# Patient Record
Sex: FEMALE | Race: WHITE | URBAN - METROPOLITAN AREA
[De-identification: names, ages, dates, MRNs, and addresses within clinical notes are randomized per-mention and may not be internally consistent; named-entity substitution may affect disease eponyms.]

---

## 2017-10-28 ENCOUNTER — HOSPITAL ENCOUNTER (INPATIENT)
Facility: CLINIC | Age: 67
LOS: 3 days | Discharge: HOME | DRG: 603 | End: 2017-10-31
Attending: INTERNAL MEDICINE | Admitting: INTERNAL MEDICINE
Payer: COMMERCIAL

## 2017-10-28 ENCOUNTER — TRANSFERRED RECORDS (OUTPATIENT)
Dept: HEALTH INFORMATION MANAGEMENT | Facility: CLINIC | Age: 67
End: 2017-10-28

## 2017-10-28 DIAGNOSIS — L03.113 RIGHT ARM CELLULITIS: Primary | ICD-10-CM

## 2017-10-28 LAB
CREAT SERPL-MCNC: 0.91 MG/DL (ref 0.52–1.04)
GFR SERPL CREATININE-BSD FRML MDRD: 62 ML/MIN/1.7M2

## 2017-10-28 PROCEDURE — 12000000 ZZH R&B MED SURG/OB

## 2017-10-28 PROCEDURE — 36415 COLL VENOUS BLD VENIPUNCTURE: CPT | Performed by: INTERNAL MEDICINE

## 2017-10-28 PROCEDURE — 25000128 H RX IP 250 OP 636: Performed by: INTERNAL MEDICINE

## 2017-10-28 PROCEDURE — 00000146 ZZHCL STATISTIC GLUCOSE BY METER IP

## 2017-10-28 PROCEDURE — 82565 ASSAY OF CREATININE: CPT | Performed by: INTERNAL MEDICINE

## 2017-10-28 PROCEDURE — 99223 1ST HOSP IP/OBS HIGH 75: CPT | Mod: AI | Performed by: INTERNAL MEDICINE

## 2017-10-28 RX ORDER — DEXTROSE MONOHYDRATE 25 G/50ML
25-50 INJECTION, SOLUTION INTRAVENOUS
Status: DISCONTINUED | OUTPATIENT
Start: 2017-10-28 | End: 2017-10-31 | Stop reason: HOSPADM

## 2017-10-28 RX ORDER — AMOXICILLIN 250 MG
1-2 CAPSULE ORAL 2 TIMES DAILY PRN
Status: DISCONTINUED | OUTPATIENT
Start: 2017-10-28 | End: 2017-10-31 | Stop reason: HOSPADM

## 2017-10-28 RX ORDER — LEVOTHYROXINE SODIUM 150 UG/1
150 TABLET ORAL EVERY MORNING
COMMUNITY
Start: 2015-03-05

## 2017-10-28 RX ORDER — NALOXONE HYDROCHLORIDE 0.4 MG/ML
.1-.4 INJECTION, SOLUTION INTRAMUSCULAR; INTRAVENOUS; SUBCUTANEOUS
Status: DISCONTINUED | OUTPATIENT
Start: 2017-10-28 | End: 2017-10-31 | Stop reason: HOSPADM

## 2017-10-28 RX ORDER — TRIAMTERENE/HYDROCHLOROTHIAZID 37.5-25 MG
1 TABLET ORAL
COMMUNITY
Start: 2016-10-28

## 2017-10-28 RX ORDER — OXYCODONE HYDROCHLORIDE 5 MG/1
5-10 TABLET ORAL EVERY 4 HOURS PRN
Status: DISCONTINUED | OUTPATIENT
Start: 2017-10-28 | End: 2017-10-31 | Stop reason: HOSPADM

## 2017-10-28 RX ORDER — NICOTINE POLACRILEX 4 MG
15-30 LOZENGE BUCCAL
Status: DISCONTINUED | OUTPATIENT
Start: 2017-10-28 | End: 2017-10-31 | Stop reason: HOSPADM

## 2017-10-28 RX ORDER — SODIUM CHLORIDE 9 MG/ML
INJECTION, SOLUTION INTRAVENOUS CONTINUOUS
Status: DISCONTINUED | OUTPATIENT
Start: 2017-10-28 | End: 2017-10-29

## 2017-10-28 RX ORDER — POTASSIUM CHLORIDE 750 MG/1
30 TABLET, EXTENDED RELEASE ORAL DAILY
COMMUNITY
Start: 2017-09-29

## 2017-10-28 RX ORDER — ONDANSETRON 2 MG/ML
4 INJECTION INTRAMUSCULAR; INTRAVENOUS EVERY 6 HOURS PRN
Status: DISCONTINUED | OUTPATIENT
Start: 2017-10-28 | End: 2017-10-31 | Stop reason: HOSPADM

## 2017-10-28 RX ORDER — LEVOTHYROXINE SODIUM 75 UG/1
150 TABLET ORAL
Status: DISCONTINUED | OUTPATIENT
Start: 2017-10-29 | End: 2017-10-31 | Stop reason: HOSPADM

## 2017-10-28 RX ORDER — FUROSEMIDE 40 MG
40 TABLET ORAL DAILY
COMMUNITY
Start: 2017-09-10

## 2017-10-28 RX ORDER — METFORMIN HYDROCHLORIDE 500 MG/1
2000 TABLET, FILM COATED, EXTENDED RELEASE ORAL AT BEDTIME
COMMUNITY
Start: 2015-02-04

## 2017-10-28 RX ORDER — ONDANSETRON 4 MG/1
4 TABLET, ORALLY DISINTEGRATING ORAL EVERY 6 HOURS PRN
Status: DISCONTINUED | OUTPATIENT
Start: 2017-10-28 | End: 2017-10-31 | Stop reason: HOSPADM

## 2017-10-28 RX ORDER — ACETAMINOPHEN 325 MG/1
650 TABLET ORAL EVERY 4 HOURS PRN
Status: DISCONTINUED | OUTPATIENT
Start: 2017-10-28 | End: 2017-10-31 | Stop reason: HOSPADM

## 2017-10-28 RX ORDER — POLYETHYLENE GLYCOL 3350 17 G/17G
17 POWDER, FOR SOLUTION ORAL DAILY PRN
Status: DISCONTINUED | OUTPATIENT
Start: 2017-10-28 | End: 2017-10-31 | Stop reason: HOSPADM

## 2017-10-28 RX ADMIN — SODIUM CHLORIDE: 9 INJECTION, SOLUTION INTRAVENOUS at 20:43

## 2017-10-28 ASSESSMENT — ACTIVITIES OF DAILY LIVING (ADL): ADLS_ACUITY_SCORE: 17

## 2017-10-28 NOTE — IP AVS SNAPSHOT
[place] [facility] [address] [facility]    [address]    [facility] [address]    Phone:  [phone]    Fax:  [phone]                                       After Visit Summary   10/28/2017    Lyndsay Biswas    MRN: [id number]           After Visit Summary Signature Page     I have received my discharge instructions, and my questions have been answered. I have discussed any challenges I see with this plan with the nurse or doctor.    ..........................................................................................................................................  Patient/Patient Representative Signature      ..........................................................................................................................................  Patient Representative Print Name and Relationship to Patient    ..................................................               ................................................  Date                                            Time    ..........................................................................................................................................  Reviewed by Signature/Title    ...................................................              ..............................................  Date                                                            Time

## 2017-10-28 NOTE — IP AVS SNAPSHOT
MRN:[id number]                      After Visit Summary   10/28/2017    Lyndsay Biswas    MRN: [id number]           Thank you!     Thank you for choosing [facility] for your care. Our goal is always to provide you with excellent care. Hearing back from our patients is one way we can continue to improve our services. Please take a few minutes to complete the written survey that you may receive in the mail after you visit. If you would like to speak to someone directly about your visit please contact Patient Relations at [phone]. Thank you!          Patient Information     Date Of Birth          1950        Designated Caregiver       Most Recent Value    Caregiver    Will someone help with your care after discharge? yes    Name of designated caregiver Sinan    Phone number of caregiver see facesheet    Caregiver address see facesheet      About your hospital stay     You were admitted on:  October 28, 2017 You last received care in the:  [place] [facility] [address] [facility]    You were discharged on:  October 31, 2017        Reason for your hospital stay       You were hospitalized for cellulitis (skin infection) of the right arm.                  Who to Call     For medical emergencies, please call 911.  For non-urgent questions about your medical care, please call your primary care provider or clinic, None          Attending Provider     Provider Specialty    Gee Siegel MD Internal Medicine       Primary Care Provider    Yehuda Marks      After Care Instructions     Activity       Your activity upon discharge: activity as tolerated            Diet       Follow this diet upon discharge: Orders Placed This Encounter      Moderate Consistent CHO Diet                  Follow-up Appointments     Follow-up and recommended labs and tests        Follow up with your primary care doctor if the cellulitis is not resolving.                  Further instructions  "from your care team       1. Take the Amoxicillin and Bactrim until all the pills are gone to treat the cellulitis of your arm.  While taking the antibiotics you should take a probiotic or eat yogurt with live cultures.  2. If you have return of redness, swelling or pain in the right arm you need to be seen again.        Pending Results     No orders found from 10/26/2017 to 10/29/2017.            Statement of Approval     Ordered          10/31/17 0922  I have reviewed and agree with all the recommendations and orders detailed in this document.  EFFECTIVE NOW     Approved and electronically signed by:  Sonali Szymanski MD             Admission Information     Date & Time Provider Department Dept. Phone    10/28/2017 Gee Siegel MD [place] [address] [facility] [phone]      Your Vitals Were     Blood Pressure Temperature Respirations Height Weight Pulse Oximetry    156/79 (BP Location: Right arm) 97.1  F (36.2  C) (Oral) 16 1.549 m (5' 1\") 126.2 kg (278 lb 3.2 oz) 93%    BMI (Body Mass Index)                   52.57 kg/m2           [employer] Information     [employer] lets you send messages to your doctor, view your test results, renew your prescriptions, schedule appointments and more. To sign up, go to [email or web address] . Click on \"Log in\" on the left side of the screen, which will take you to the Welcome page. Then click on \"Sign up Now\" on the right side of the page.     You will be asked to enter the access code listed below, as well as some personal information. Please follow the directions to create your username and password.     Your access code is: [email or web address]  Expires: 2018  9:31 AM     Your access code will  in 90 days. If you need help or a new code, please call your [place] clinic or [phone].        Care EveryWhere ID     This is your Care EveryWhere ID. This could be used by other organizations to access your [place] medical records  [id number]        Equal " Access to Services     [facility]: Hadii aad ku hadflorecitagil Magdaleno, waaxda luqadaha, qaybta kaalmaelen argueta, jocelyn piedra. So [facility] [phone].    ATENCIÓN: Si habla español, tiene a ceballos disposición servicios gratuitos de asistencia lingüística. Llame al [phone].    We comply with applicable federal civil rights laws and [place] laws. We do not discriminate on the basis of race, color, national origin, age, disability, sex, sexual orientation, or gender identity.               Review of your medicines      START taking        Dose / Directions    amoxicillin 500 MG capsule   Commonly known as:  AMOXIL        Dose:  500 mg   Take 1 capsule (500 mg) by mouth 3 times daily for 6 days   Quantity:  18 capsule   Refills:  0       sulfamethoxazole-trimethoprim 800-160 MG per tablet   Commonly known as:  BACTRIM DS/SEPTRA DS        Dose:  1 tablet   Take 1 tablet by mouth 2 times daily for 6 days   Quantity:  12 tablet   Refills:  0         CONTINUE these medicines which have NOT CHANGED        Dose / Directions    ALEVE PO        Dose:  220 mg   Take 220 mg by mouth daily as needed for moderate pain   Refills:  0       calcium carbonate 1250 MG tablet   Commonly known as:  OS-JERMAIN 500 mg [place]. Ca        Dose:  2 tablet   Take 2 tablets by mouth daily   Refills:  0       furosemide 40 MG tablet   Commonly known as:  LASIX        Dose:  40 mg   Take 40 mg by mouth daily   Refills:  0       KLOR-CON 10 MEQ tablet   Generic drug:  potassium chloride        Dose:  30 mEq   Take 30 mEq by mouth daily   Refills:  0       levothyroxine 150 MCG tablet   Commonly known as:  SYNTHROID/LEVOTHROID        Dose:  150 mcg   Take 150 mcg by mouth every morning   Refills:  0       metFORMIN modified 500 MG 24 hr tablet   Commonly known as:  GLUMETZA        Dose:  2000 mg   Take 2,000 mg by mouth At Bedtime   Refills:  0       Multi-vitamin Tabs tablet        Dose:  1 tablet   Take 1 tablet by mouth  daily   Refills:  0       triamterene-hydrochlorothiazide 37.5-25 MG per tablet   Commonly known as:  MAXZIDE-25        Dose:  1 tablet   Take 1 tablet by mouth nightly as needed for edema   Refills:  0            Where to get your medicines      These medications were sent to [facility] [place], [place] - [address] [facility]  [address] [facility] [address]     Phone:  [phone]     amoxicillin 500 MG capsule    sulfamethoxazole-trimethoprim 800-160 MG per tablet               ANTIBIOTIC INSTRUCTION     You've Been Prescribed an Antibiotic - Now What?  Your healthcare team thinks that you or your loved one might have an infection. Some infections can be treated with antibiotics, which are powerful, life-saving drugs. Like all medications, antibiotics have side effects and should only be used when necessary. There are some important things you should know about your antibiotic treatment.      Your healthcare team may run tests before you start taking an antibiotic.    Your team may take samples (e.g., from your blood, urine or other areas) to run tests to look for bacteria. These test can be important to determine if you need an antibiotic at all and, if you do, which antibiotic will work best.      Within a few days, your healthcare team might change or even stop your antibiotic.    Your team may start you on an antibiotic while they are working to find out what is making you sick.    Your team might change your antibiotic because test results show that a different antibiotic would be better to treat your infection.    In some cases, once your team has more information, they learn that you do not need an antibiotic at all. They may find out that you don't have an infection, or that the antibiotic you're taking won't work against your infection. For example, an infection caused by a virus can't be treated with antibiotics. Staying on an antibiotic when you don't need it is more  likely to be harmful than helpful.      You may experience side effects from your antibiotic.    Like all medications, antibiotics have side effects. Some of these can be serious.    Let you healthcare team know if you have any known allergies when you are admitted to the hospital.    One significant side effect of nearly all antibiotics is the risk of severe and sometimes deadly diarrhea caused by Clostridium difficile (C. Difficile). This occurs when a person takes antibiotics because some good germs are destroyed. Antibiotic use allows C. diificile to take over, putting patients at high risk for this serious infection.    As a patient or caregiver, it is important to understand your or your loved one's antibiotic treatment. It is especially important for caregivers to speak up when patients can't speak for themselves. Here are some important questions to ask your healthcare team.    What infection is this antibiotic treating and how do you know I have that infection?    What side effects might occur from this antibiotic?    How long will I need to take this antibiotic?    Is it safe to take this antibiotic with other medications or supplements (e.g., vitamins) that I am taking?     Are there any special directions I need to know about taking this antibiotic? For example, should I take it with food?    How will I be monitored to know whether my infection is responding to the antibiotic?    What tests may help to make sure the right antibiotic is prescribed for me?      Information provided by:  www.cdc.gov/getsmart  U.S. Department of Health and Human Services  Centers for disease Control and Prevention  National Center for Emerging and Zoonotic Infectious Diseases  Division of Healthcare Quality Promotion         Protect others around you: Learn how to safely use, store and throw away your medicines at www.disposemymeds.org.             Medication List: This is a list of all your medications and when to take  them. Check marks below indicate your daily home schedule. Keep this list as a reference.      Medications           Morning Afternoon Evening Bedtime As Needed    ALEVE PO   Take 220 mg by mouth daily as needed for moderate pain                                   amoxicillin 500 MG capsule   Commonly known as:  AMOXIL   Take 1 capsule (500 mg) by mouth 3 times daily for 6 days            8am       2pm       8pm               calcium carbonate 1250 MG tablet   Commonly known as:  OS-JERMAIN 500 mg [place]. Ca   Take 2 tablets by mouth daily            8am                       furosemide 40 MG tablet   Commonly known as:  LASIX   Take 40 mg by mouth daily   Last time this was given:  40 mg on 10/31/2017  8:48 AM            8am                       KLOR-CON 10 MEQ tablet   Take 30 mEq by mouth daily   Generic drug:  potassium chloride            8am                       levothyroxine 150 MCG tablet   Commonly known as:  SYNTHROID/LEVOTHROID   Take 150 mcg by mouth every morning   Last time this was given:  150 mcg on 10/31/2017  6:57 AM            7am                       metFORMIN modified 500 MG 24 hr tablet   Commonly known as:  GLUMETZA   Take 2,000 mg by mouth At Bedtime                        9pm           Multi-vitamin Tabs tablet   Take 1 tablet by mouth daily            8am                       sulfamethoxazole-trimethoprim 800-160 MG per tablet   Commonly known as:  BACTRIM DS/SEPTRA DS   Take 1 tablet by mouth 2 times daily for 6 days            8am           8pm               triamterene-hydrochlorothiazide 37.5-25 MG per tablet   Commonly known as:  MAXZIDE-25   Take 1 tablet by mouth nightly as needed for edema                                             More Information        Discharge Instructions for Cellulitis  You have been diagnosed with cellulitis. This is an infection in the deepest layer of the skin. In some cases, the infection also affects the muscle. Cellulitis is caused by bacteria. The  bacteria can enter the body through broken skin. This can happen with a cut, scratch, animal bite, or an insect bite that has been scratched. You may have been treated in the hospital with antibiotics and fluids. You will likely be given a prescription for antibiotics to take at home. This sheet will help you take care of yourself at home.  Home care  When you are home:    Take the prescribed antibiotic medicine you are given as directed until it is gone. Take it even if you feel better. It treats the infection and stops it from returning. Not taking all the medicine can make future infections hard to treat.    Keep the infected area clean.    When possible, raise the infected area above the level of your heart. This helps keep swelling down.    Talk with your healthcare provider if you are in pain. Ask what kind of over-the-counter medicine you can take for pain.    Apply clean bandages as advised.    Take your temperature once a day for a week.    Wash your hands often to prevent spreading the infection.  In the future, wash your hands before and after you touch cuts, scratches, or bandages. This will help prevent infection.   When to call your healthcare provider  Call your healthcare provider immediately if you have any of the following:    Difficulty or pain when moving the joints above or below the infected area    Discharge or pus draining from the area    Fever of 100.4 F (38 C) or higher, or as directed by your healthcare provider    Pain that gets worse in or around the infected     Redness that gets worse in or around the infected area, particularly if the area of redness expands to a wider area    Shaking chills    Swelling of the infected area    Vomiting   Date Last Reviewed: 8/1/2016 2000-2017 [employer]. [address] [place] [address], [place] [address]. All rights reserved. This information is not intended as a substitute for professional medical care. Always follow your healthcare  professional's instructions.                Cellulitis  Cellulitis is an infection of the deep layers of skin. A break in the skin, such as a cut or scratch, can let bacteria under the skin. If the bacteria get to deep layers of the skin, it can be serious. If not treated, cellulitis can get into the bloodstream and lymph nodes. The infection can then spread throughout the body. This causes serious illness.  Cellulitis causes the affected skin to become red, swollen, warm, and sore. The reddened areas have a visible border. An open sore may leak fluid (pus). You may have a fever, chills, and pain.  Cellulitis is treated with antibiotics taken for 7 to 10 days. An open sore may be cleaned and covered with cool wet gauze. Symptoms should get better 1 to 2 days after treatment is started. Make sure to take all the antibiotics for the full number of days until they are gone. Keep taking the medicine even if your symptoms go away.  Home care  Follow these tips:    Limit the use of the part of your body with cellulitis.     If the infection is on your leg, keep your leg raised while sitting. This will help to reduce swelling.    Take all of the antibiotic medicine exactly as directed until it is gone. Do not miss any doses, especially during the first 7 days. Don t stop taking the medicine when your symptoms get better.    Keep the affected area clean and dry.    Wash your hands with soap and warm water before and after touching your skin. Anyone else who touches your skin should also wash his or her hands. Don't share towels.  Follow-up care  Follow up with your healthcare provider, or as advised. If your infection does not go away on the first antibiotic, your healthcare provider will prescribe a different one.  When to seek medical advice  Call your healthcare provider right away if any of these occur:    Red areas that spread    Swelling or pain that gets worse    Fluid leaking from the skin (pus)    Fever higher of  100.4  F (38.0  C) or higher after 2 days on antibiotics  Date Last Reviewed: 9/1/2016 2000-2017 [employer]. [address] [place] [address], [place] [address]. All rights reserved. This information is not intended as a substitute for professional medical care. Always follow your healthcare professional's instructions.                Sulfamethoxazole; Trimethoprim, SMX-TMP tablets  Brand Names: Bacter-Aid DS, Bactrim, Bactrim DS, Septra, Septra DS  What is this medicine?  SULFAMETHOXAZOLE; TRIMETHOPRIM or SMX-TMP (suhl fuh meth OK ernestina zohl; trye METH oh prim) is a combination of a sulfonamide antibiotic and a second antibiotic, trimethoprim. It is used to treat or prevent certain kinds of bacterial infections. It will not work for colds, flu, or other viral infections.  How should I use this medicine?  Take this medicine by mouth with a full glass of water. Follow the directions on the prescription label. Take your medicine at regular intervals. Do not take it more often than directed. Do not skip doses or stop your medicine early.  Talk to your pediatrician regarding the use of this medicine in children. Special care may be needed. This medicine has been used in children as young as 2 months of age.  What side effects may I notice from receiving this medicine?  Side effects that you should report to your doctor or health care professional as soon as possible:    allergic reactions like skin rash or hives, swelling of the face, lips, or tongue    breathing problems    fever or chills, sore throat    irregular heartbeat, chest pain    joint or muscle pain    pain or difficulty passing urine    red pinpoint spots on skin    redness, blistering, peeling or loosening of the skin, including inside the mouth    unusual bleeding or bruising    unusually weak or tired    yellowing of the eyes or skin  Side effects that usually do not require medical attention (report to your doctor or health care professional if  they continue or are bothersome):    diarrhea    dizziness    headache    loss of appetite    nausea, vomiting    nervousness  What may interact with this medicine?  Do not take this medicine with any of the following medications:    aminobenzoate potassium    dofetilide    metronidazole  This medicine may also interact with the following medications:    ACE inhibitors like benazepril, enalapril, lisinopril, and ramipril    birth control pills    cyclosporine    digoxin    diuretics    indomethacin    medicines for diabetes    methenamine    methotrexate    phenytoin    potassium supplements    pyrimethamine    sulfinpyrazone    tricyclic antidepressants    warfarin  What if I miss a dose?  If you miss a dose, take it as soon as you can. If it is almost time for your next dose, take only that dose. Do not take double or extra doses.  Where should I keep my medicine?  Keep out of the reach of children.  Store at room temperature between 20 to 25 degrees C (68 to 77 degrees F). Protect from light. Throw away any unused medicine after the expiration date.  What should I tell my health care provider before I take this medicine?  They need to know if you have any of these conditions:    anemia    asthma    being treated with anticonvulsants    if you frequently drink alcohol containing drinks    kidney disease    liver disease    low level of folic acid or glucose-6-phosphate dehydrogenase    poor nutrition or malabsorption    porphyria    severe allergies    thyroid disorder    an unusual or allergic reaction to sulfamethoxazole, trimethoprim, sulfa drugs, other medicines, foods, dyes, or preservatives    pregnant or trying to get pregnant    breast-feeding  What should I watch for while using this medicine?  Tell your doctor or health care professional if your symptoms do not improve. Drink several glasses of water a day to reduce the risk of kidney problems.  Do not treat diarrhea with over the counter products.  Contact your doctor if you have diarrhea that lasts more than 2 days or if it is severe and watery.  This medicine can make you more sensitive to the sun. Keep out of the sun. If you cannot avoid being in the sun, wear protective clothing and use a sunscreen. Do not use sun lamps or tanning beds/booths.  NOTE:This sheet is a summary. It may not cover all possible information. If you have questions about this medicine, talk to your doctor, pharmacist, or health care provider. Copyright  2017 [employer]

## 2017-10-29 LAB
ANION GAP SERPL CALCULATED.3IONS-SCNC: 7 MMOL/L (ref 3–14)
BASOPHILS # BLD AUTO: 0 10E9/L (ref 0–0.2)
BASOPHILS NFR BLD AUTO: 0.8 %
BUN SERPL-MCNC: 18 MG/DL (ref 7–30)
CALCIUM SERPL-MCNC: 7.1 MG/DL (ref 8.5–10.1)
CHLORIDE SERPL-SCNC: 106 MMOL/L (ref 94–109)
CO2 SERPL-SCNC: 27 MMOL/L (ref 20–32)
CREAT SERPL-MCNC: 0.81 MG/DL (ref 0.52–1.04)
DIFFERENTIAL METHOD BLD: ABNORMAL
EOSINOPHIL # BLD AUTO: 0.1 10E9/L (ref 0–0.7)
EOSINOPHIL NFR BLD AUTO: 2.8 %
ERYTHROCYTE [DISTWIDTH] IN BLOOD BY AUTOMATED COUNT: 14 % (ref 10–15)
GFR SERPL CREATININE-BSD FRML MDRD: 70 ML/MIN/1.7M2
GLUCOSE BLDC GLUCOMTR-MCNC: 102 MG/DL (ref 70–99)
GLUCOSE BLDC GLUCOMTR-MCNC: 109 MG/DL (ref 70–99)
GLUCOSE BLDC GLUCOMTR-MCNC: 120 MG/DL (ref 70–99)
GLUCOSE BLDC GLUCOMTR-MCNC: 123 MG/DL (ref 70–99)
GLUCOSE BLDC GLUCOMTR-MCNC: 125 MG/DL (ref 70–99)
GLUCOSE BLDC GLUCOMTR-MCNC: 147 MG/DL (ref 70–99)
GLUCOSE SERPL-MCNC: 109 MG/DL (ref 70–99)
HCT VFR BLD AUTO: 32.8 % (ref 35–47)
HGB BLD-MCNC: 10.5 G/DL (ref 11.7–15.7)
IMM GRANULOCYTES # BLD: 0 10E9/L (ref 0–0.4)
IMM GRANULOCYTES NFR BLD: 0.5 %
LYMPHOCYTES # BLD AUTO: 0.4 10E9/L (ref 0.8–5.3)
LYMPHOCYTES NFR BLD AUTO: 8.9 %
MCH RBC QN AUTO: 28.7 PG (ref 26.5–33)
MCHC RBC AUTO-ENTMCNC: 32 G/DL (ref 31.5–36.5)
MCV RBC AUTO: 90 FL (ref 78–100)
MONOCYTES # BLD AUTO: 0.2 10E9/L (ref 0–1.3)
MONOCYTES NFR BLD AUTO: 5.3 %
NEUTROPHILS # BLD AUTO: 3.2 10E9/L (ref 1.6–8.3)
NEUTROPHILS NFR BLD AUTO: 81.7 %
NRBC # BLD AUTO: 0 10*3/UL
NRBC BLD AUTO-RTO: 0 /100
PLATELET # BLD AUTO: 141 10E9/L (ref 150–450)
POTASSIUM SERPL-SCNC: 3.1 MMOL/L (ref 3.4–5.3)
POTASSIUM SERPL-SCNC: 4.1 MMOL/L (ref 3.4–5.3)
RBC # BLD AUTO: 3.66 10E12/L (ref 3.8–5.2)
SODIUM SERPL-SCNC: 140 MMOL/L (ref 133–144)
WBC # BLD AUTO: 4 10E9/L (ref 4–11)

## 2017-10-29 PROCEDURE — 25000132 ZZH RX MED GY IP 250 OP 250 PS 637: Performed by: INTERNAL MEDICINE

## 2017-10-29 PROCEDURE — 25000128 H RX IP 250 OP 636: Performed by: INTERNAL MEDICINE

## 2017-10-29 PROCEDURE — 12000000 ZZH R&B MED SURG/OB

## 2017-10-29 PROCEDURE — 80048 BASIC METABOLIC PNL TOTAL CA: CPT | Performed by: INTERNAL MEDICINE

## 2017-10-29 PROCEDURE — 99232 SBSQ HOSP IP/OBS MODERATE 35: CPT | Performed by: INTERNAL MEDICINE

## 2017-10-29 PROCEDURE — 00000146 ZZHCL STATISTIC GLUCOSE BY METER IP

## 2017-10-29 PROCEDURE — 85025 COMPLETE CBC W/AUTO DIFF WBC: CPT | Performed by: INTERNAL MEDICINE

## 2017-10-29 PROCEDURE — 84132 ASSAY OF SERUM POTASSIUM: CPT | Performed by: INTERNAL MEDICINE

## 2017-10-29 PROCEDURE — 36415 COLL VENOUS BLD VENIPUNCTURE: CPT | Performed by: INTERNAL MEDICINE

## 2017-10-29 RX ORDER — POTASSIUM CHLORIDE 7.45 MG/ML
10 INJECTION INTRAVENOUS
Status: DISCONTINUED | OUTPATIENT
Start: 2017-10-29 | End: 2017-10-31 | Stop reason: HOSPADM

## 2017-10-29 RX ORDER — MULTIPLE VITAMINS W/ MINERALS TAB 9MG-400MCG
1 TAB ORAL DAILY
COMMUNITY

## 2017-10-29 RX ORDER — LEVOTHYROXINE SODIUM 75 UG/1
150 TABLET ORAL EVERY MORNING
Status: DISCONTINUED | OUTPATIENT
Start: 2017-10-29 | End: 2017-10-29

## 2017-10-29 RX ORDER — LIDOCAINE 40 MG/G
CREAM TOPICAL
Status: DISCONTINUED | OUTPATIENT
Start: 2017-10-29 | End: 2017-10-31 | Stop reason: CLARIF

## 2017-10-29 RX ORDER — POTASSIUM CHLORIDE 29.8 MG/ML
20 INJECTION INTRAVENOUS
Status: DISCONTINUED | OUTPATIENT
Start: 2017-10-29 | End: 2017-10-29 | Stop reason: ALTCHOICE

## 2017-10-29 RX ORDER — CALCIUM CARBONATE 500(1250)
2 TABLET ORAL DAILY
COMMUNITY

## 2017-10-29 RX ORDER — FUROSEMIDE 40 MG
40 TABLET ORAL DAILY
Status: DISCONTINUED | OUTPATIENT
Start: 2017-10-29 | End: 2017-10-31 | Stop reason: HOSPADM

## 2017-10-29 RX ORDER — HEPARIN SODIUM,PORCINE 10 UNIT/ML
2-5 VIAL (ML) INTRAVENOUS
Status: DISCONTINUED | OUTPATIENT
Start: 2017-10-29 | End: 2017-10-31 | Stop reason: HOSPADM

## 2017-10-29 RX ORDER — POTASSIUM CHLORIDE 1.5 G/1.58G
20-40 POWDER, FOR SOLUTION ORAL
Status: DISCONTINUED | OUTPATIENT
Start: 2017-10-29 | End: 2017-10-31 | Stop reason: HOSPADM

## 2017-10-29 RX ORDER — POTASSIUM CL/LIDO/0.9 % NACL 10MEQ/0.1L
10 INTRAVENOUS SOLUTION, PIGGYBACK (ML) INTRAVENOUS
Status: DISCONTINUED | OUTPATIENT
Start: 2017-10-29 | End: 2017-10-31 | Stop reason: HOSPADM

## 2017-10-29 RX ORDER — POTASSIUM CHLORIDE 1500 MG/1
20-40 TABLET, EXTENDED RELEASE ORAL
Status: DISCONTINUED | OUTPATIENT
Start: 2017-10-29 | End: 2017-10-31 | Stop reason: HOSPADM

## 2017-10-29 RX ADMIN — POTASSIUM CHLORIDE 20 MEQ: 1500 TABLET, EXTENDED RELEASE ORAL at 11:28

## 2017-10-29 RX ADMIN — Medication 1500 MG: at 05:10

## 2017-10-29 RX ADMIN — FUROSEMIDE 40 MG: 40 TABLET ORAL at 18:14

## 2017-10-29 RX ADMIN — LEVOTHYROXINE SODIUM 150 MCG: 75 TABLET ORAL at 09:08

## 2017-10-29 RX ADMIN — POTASSIUM CHLORIDE 40 MEQ: 1500 TABLET, EXTENDED RELEASE ORAL at 09:09

## 2017-10-29 ASSESSMENT — ACTIVITIES OF DAILY LIVING (ADL)
ADLS_ACUITY_SCORE: 9

## 2017-10-29 NOTE — PHARMACY-ADMISSION MEDICATION HISTORY
Admission medication history interview status for this patient is complete. See [facility] admission navigator for allergy information, prior to admission medications and immunization status.     Medication history interview source(s):Patient  Medication history resources (including written lists, pill bottles, clinic record):None  Primary pharmacy: [facility]    Changes made to PTA medication list:  Added: aleve, MVI, calcium, metformin XR 2000 mg  Deleted: none  Changed:     Actions taken by pharmacist (provider contacted, etc):Called [facility]     Additional medication history information:Patient states she takes metformin XR 4 tabs/day, however last refill was 2/2017, and pt adamant not using other pharmacy --> likely pt not taking metformin    Medication reconciliation/reorder completed by provider prior to medication history? No    Do you take OTC medications (eg tylenol, ibuprofen, fish oil, eye/ear drops, etc)?Y        Prior to Admission medications    Medication Sig Last Dose Taking? Auth Provider   Naproxen Sodium (ALEVE PO) Take 220 mg by mouth daily as needed for moderate pain Past Week at Unknown time Yes Unknown, Entered By History   multivitamin, therapeutic with minerals (MULTI-VITAMIN) TABS tablet Take 1 tablet by mouth daily 10/27/2017 Yes Unknown, Entered By History   calcium carbonate (OS-JERMAIN 500 MG [place]. [place]) 1250 MG tablet Take 2 tablets by mouth daily 10/26/2017 Yes Unknown, Entered By History   furosemide (LASIX) 40 MG tablet Take 40 mg by mouth daily 10/27/2017 at AM Yes Reported, Patient   potassium chloride (KLOR-CON) 10 MEQ tablet Take 30 mEq by mouth daily 10/27/2017 at AM Yes Reported, Patient   levothyroxine (SYNTHROID/LEVOTHROID) 150 MCG tablet Take 150 mcg by mouth every morning 10/28/2017 at AM Yes Reported, Patient          triamterene-hydrochlorothiazide (MAXZIDE-25) 37.5-25 MG per tablet Take 1 tablet by mouth nightly as needed for edema More than a month at Unknown Yes Reported,  Patient

## 2017-10-29 NOTE — H&P
[facility]  Hospitalist Admission Note  Name: Lyndsay Biswas    MRN: [id number]  YOB: 1950    Age: 67 year old  Date of admission: 10/28/2017  Primary care provider: Yehuda Marks    Chief Complaint:  R arm swelling, redness    Assessment and Plan:   1.   RUE cellulitis: significant progression of entire R arm swelling and erythema convincing for cellulitis.  She has a low grade temp now and left shift on her CBC despite no leukocytosis, but this lab was after 3 doses of keflex.  She does not appear septic, but I am concerned about the overall appearance of the arm and the significant worsening today.  Favor starting with broad vancomycin coverage given her previous possible MRSA necrotizing fasciitis.  The overall appearance does look more streptococcal.  She is a borderline type II DM, but overall BG well controlled and doubt need for GNR or pseudomonal coverage now.  -will continue vancomycin for now  -follow blood cultures  -if decompensating or worsening pain/redness/swelling tomorrow would likely consult orthopedics to evaluate for possible deeper infection vs obtaining MRI  -once improving if not culture data would consider switch to an oral abx that has good streptococcal coverage and some MRSA coverage and monitoring    2.   Hx of necrotizing fasciitis of the left leg: severe leg infection in 1996 and per patient luckily did not need fasciotomies but was close.  She was told this was MRSA infection, but she is not sure when asked if this was culture proven.  Has had one other episode of cellulitis in the left arm since then.    3.   Prediabetes vs type II DM: last A1c 6.1, but is on metformin XR 500mg hs.  She say she is pre-diabetic.  .  -medium dose SSI  -hold metformin in case of need for further imaging/contrast studies    4.   Morbid obesity: BMI 52.5    5.   Chronic LE edema: pta on lasix 40mg daily, klor 30meq daily, and prn maxzide for leg swelling.   Has significant LE edema on exam, L > R.  -hold lasix today given acute infection, but consider adding this back with klor tomorrow    6.   Hx papillary thyroid cancer: s/p thyroidectomy and LN dissection 2014.    -resume pta synthroid    DVT Prophylaxis: Pneumatic Compression Devices (in case of decompensation/need for surgery)  Code Status: Full Code  FEN: consis carb med, 100ml/hr NS  Discharge Dispo: [facility]  Estimated Disch Date / # of Days until Disch: admit to inpatient for IV abx.  Expect 2-3 night admission.      History of Present Illness:  Lyndsay Biswas is a 67 year old female with PMH including with remote hx of nec fasc of the left leg, DM2, morbid obesity, thyroid cancer, and chronic LE edema who is a direct admit from [place] urgent care.  Two days ago the patient developed some soreness to the upper right arm that progressed and then went all the way down to the hand.  She then noted some redness of the skin and warmth.  She went to urgent care and was given keflex for cellulitis.  She took 3 doses, but he redness is much worse, the pain is worse (moderately severe), and she had chills all day today so she went back to urgent care.  She has not had any fevers, but did not check her temp.  Denies any trauma or insect bites to the arm.  Denies hx of blood clot.  She did have nec fasc in the left leg in 1996.  She says they believed it to be MRSA, but she is not sure if that is culture proven.  She did not require surgery then.  On ROS no chest pain, sob, abdominal pain, diarrhea.      History obtained from patient, medical record, and from Dr. Mejias at the urgent care facility.  she is vitally stable without tachycardia, hypotension, or fever.  WBC is 9.1, but there is a left shift.  Bmp is unremarkable.  A repeat US of the RUE showed no DVT nor fluid collection to suggest abscess.  She was given ancef.  Due to the the degree of swelling and pain along with rapid spreading of erythema despite  "keflex and in the setting of a previous possible MRSA nec fasc she was started on vancomycin.  Blood cultures were drawn.  Accepted transfer to inpatient.     Past Medical History reviewed:  Necrotizing fascitis of LLE in 1996  Morbid obesity  DM2 vs prediabetes  Chronic LE edema  Papillary thyroid cancer    Past Surgical History reviewed:  Thyroidectomy  Cholecystectomy     Social History reviewed:  Non-smoker  Rare etoh  Works as an     Family History reviewed:  Mother with DM and CHF  Father with HTN  Sister with CAD    Allergies:  Allergies no known allergies  Medications:  Prior to Admission medications    Medication Sig Last Dose Taking? Auth Provider   furosemide (LASIX) 40 MG tablet Take 40 mg by mouth daily  Yes Reported, Patient   potassium chloride (KLOR-CON) 10 MEQ tablet Take 30 mEq by mouth daily  Yes Reported, Patient   levothyroxine (SYNTHROID/LEVOTHROID) 150 MCG tablet Take 150 mcg by mouth every morning  Yes Reported, Patient   metFORMIN modified (GLUMETZA) 500 MG 24 hr tablet Take 500 mg by mouth At Bedtime  Yes Reported, Patient   triamterene-hydrochlorothiazide (MAXZIDE-25) 37.5-25 MG per tablet Take 1 tablet by mouth nightly as needed for edema  Yes Reported, Patient   POTASSIMIN OR None Entered   Reported, Patient   OXYCODONE-ACETAMINOPHEN OR None Entered   Reported, Patient   FLEXERIL OR None Entered   Reported, Patient   DYAZIDE OR None Entered   Reported, Patient     Review of Systems:  A Comprehensive greater than 10 system review of systems was carried out.  Pertinent positives and negatives are noted above.  Otherwise negative.     Physical Exam:  Blood pressure 130/63, temperature 100.3  F (37.9  C), temperature source Oral, resp. rate 20, height 1.549 m (5' 1\"), weight 126.2 kg (278 lb 3.2 oz), SpO2 95 %.  Wt Readings from Last 1 Encounters:   10/28/17 126.2 kg (278 lb 3.2 oz)     Exam:  Constitutional: Awake, NAD  Eyes: sclera white   HEENT: atraumatic, MMM  Respiratory: " no respiratory distress, lungs cta bilaterally, no crackles or wheeze  Cardiovascular: RRR.  1/6 RUSB systolic murmur  GI: obese, non-tender, not distended, bowel sounds present  Skin: blanching erythema involving the dorsum of the R hand, up the medial forearm and medial upper arm to just below the shoulder.  Musculoskeletal/extremities: 2+ bilateral LE edema.  Significant edema of the R hand, lower and upper arm that is hot to the touch.  No palpable abscess and not severely tender to the touch  Neurologic: A&O, speech clear, strength and light touch sensation grossly normal  Psychiatric: calm, cooperative, normal affect    Lab and imaging data personally reviewed:  Labs from urgent care:  WBC 9.1, Hg 12.6, platelets 194.  Elevated absolute neutrophils  Na 138, K 3.7, chloride 99, bicarb 29, BUN 19, creatinine 0.9,     Imaging:  RUE US from urgent care:  No DVT and no fluid collections to suggest abscess    Gee Siegel MD  Hospitalist  [facility]

## 2017-10-29 NOTE — PLAN OF CARE
All VSS, LS clear and satting well on RA.  .  Receiving Vanco.  Contact precautions for hx of MRSA.  No complaints of pain or nausea, no PRN's given.  Pt slept well throughout shift.  Will continue with POC.

## 2017-10-29 NOTE — PLAN OF CARE
Problem: Patient Care Overview  Goal: Plan of Care/Patient Progress Review  Outcome: No Change  Vss, no co pain/cp/sob.   , 120.  IVF dc'd, K+ 3.1, replaced with recheck ordered for 1530, pt needs to get po lasix once K+ level is improved (see note for details), up with SBA.  RUE sore/pink/swollen, elevated RUE/BLE up on pillows, edema noted to BLE.  On contact isolation, calls prn for assist, BM yesterday per pt.  Possible dc in am if continued improvement.  Continue poc and monitoring.

## 2017-10-29 NOTE — PROVIDER NOTIFICATION
Paged dr ramachandran: S&H orders seen for lasix to be given. K+ being replaced for 3.1 now.  Do you want the lasix to be given once K+ is > 3.4? Please advise    Spoke with md per phone regarding the above, she states that it is ok for the lasix to be given once the K+ has been replaced. Will continue with protocol and recheck K+ this afternoon, then give lasix once K+ level is appropriate.

## 2017-10-29 NOTE — PHARMACY-VANCOMYCIN DOSING SERVICE
Pharmacy Vancomycin Initial Note  Date of Service 2017  Patient's  1950  67 year old, female    Indication: Skin and Soft Tissue Infection    Current estimated CrCl = Estimated Creatinine Clearance: 76 mL/min (based on Cr of 0.91).    Creatinine for last 3 days  10/28/2017:  8:50 PM Creatinine 0.91 mg/dL    Recent Vancomycin Level(s) for last 3 days  No results found for requested labs within last 72 hours.      Vancomycin IV Administrations (past 72 hours)      No vancomycin orders with administrations in past 72 hours.                Nephrotoxins and other renal medications (Future)    Start     Dose/Rate Route Frequency Ordered Stop    10/29/17 0500  vancomycin (VANCOCIN) 1500 mg in 0.9% NaCl 250 mL PREMIX      1,500 mg  166.7 mL/hr over 90 Minutes Intravenous EVERY 12 HOURS 10/28/17 2124            Contrast Orders - past 72 hours     None                Plan:  1.  Start vancomycin  1500 mg IV q12h. Patient already received a dose at urgent care.  2.  Goal Trough Level: 10-15 mg/L   3.  Pharmacy will check trough levels as appropriate in 1-3 Days.    4. Serum creatinine levels will be ordered a minimum of twice weekly.    5. Ashmore method utilized to dose vancomycin therapy: Method 1    Sherice Tyler

## 2017-10-29 NOTE — PLAN OF CARE
Problem: Patient Care Overview  Goal: Plan of Care/Patient Progress Review  Outcome: No Change  Pt A&Ox4, VSS, LS clear, BS audible - BM 10/28. Up SBA, mod CHO diet. IV vancomycin for dx of RUE cellulitis. 3+ to 4+ RUE edema, warm/hot, pink, tender. Borders marked. +2 RLE edema, +3 LLE edema. Denies pain. . IVF running at 100mL/hr. Non tele. Per MD possible DC 2-3 days.

## 2017-10-29 NOTE — PROGRESS NOTES
[facility]  Hospitalist Progress Note  Sonali Szymanski MD 10/29/17  Text Page  Pager: [phone] (7am-6pm)    Reason for Stay (Diagnosis): RUE Cellulitis         Assessment and Plan:      Summary of Stay: Lyndsay Biswas is a 67 year old female with past medical history of remote history of necrotizing fasciitis (believed MRSA, did not require surgery), chronic LE edema, DM2, morbid obesity and thyroid cancer who was admitted on 10/28/2017 with progressive erythema and pain of the right upper extremity despite PO antibiotics with progressive cellulitis.    Problem List/Assessment and Plan:     1. RUE cellulitis: significant progression of entire R arm swelling and erythema convincing for cellulitis despite taking Keflex as outpatient.  She was not septic but has history of prior necrotizing fasciitis (did not require surgery, believed to be MRSA).  She is significantly improving with IV Vancomycin.  Suspect that she has either MRSA or strep.  As long as improving tomorrow will plan to transition to PO antibiotics.  - Continue IV Vancomycin     2. Hx of necrotizing fasciitis of the left leg: severe leg infection in 1996 and per patient luckily did not need fasciotomies but was close.  She was told this was MRSA infection, but she is not sure when asked if this was culture proven.  Has had one other episode of cellulitis in the left arm since then.     3. Prediabetes: last A1c 6.1, but is on metformin  mg hs.  Unclear if she has been taking Metformin, last refill was 2/2017 per pharmacy.  - Medium dose SSI  - Hold Metformin for now     4. Morbid obesity: BMI 52.5.     5. Chronic LE edema: PTA on lasix 40 mg daily, klor 30 meq daily, and prn Maxzide for leg swelling.  Has significant LE edema on exam, L > R.  - Resume Lasix as she is hemodynamically stable     6. Hx papillary thyroid cancer: s/p thyroidectomy and LN dissection 2014.    - Resume PTA synthroid    DVT Prophylaxis: Pneumatic  "Compression Devices  Code Status: Full Code  Discharge Dispo: Home  Estimated Disch Date / # of Days until Disch: Tomorrow as long as cellulitis continues to improve        Interval History (Subjective):      Patient states she is feeling well today.  Her arm is much less painful today and the redness has significantly decreased although the swelling is still present.  No fevers, chills, nausea, emesis, abdominal pain, CP or SOB.                  Physical Exam:      Last Vital Signs:  /45 (BP Location: Right arm)  Temp 98.1  F (36.7  C) (Oral)  Resp 20  Ht 1.549 m (5' 1\")  Wt 126.2 kg (278 lb 3.2 oz)  SpO2 97%  BMI 52.57 kg/m2    General: Alert, awake, no acute distress.  HEENT: Normocephalic and atraumatic, eyes anicteric and without scleral injection, EOMI, face symmetric, MMM.  Cardiac: RRR, normal S1, S2. No m/g/r, chronic lymphedema.  Pulmonary: Normal chest rise, normal work of breathing.  Lungs CTAB without crackles or wheezing.  Abdomen: soft, non-tender, non-distended.  Normoactive bowel sounds, no guarding or rebound tenderness.  Extremities: no deformities.  Warm, well perfused.  Skin: Erythema of right arm significantly diminished and has decreased from marked boarders, more swollen compared to left arm but non tender.  Warm and Dry.  Neuro: No focal deficits.  Speech clear.  Coordination and strength grossly normal.  Psych: Alert and oriented x3. Appropriate affect.         Medications:      All current medications were reviewed with changes reflected in problem list.         Data:      All new lab and imaging data was reviewed.   Labs:    Recent Labs  Lab 10/29/17  0700 10/28/17  2050     --    POTASSIUM 3.1*  --    CHLORIDE 106  --    CO2 27  --    ANIONGAP 7  --    *  --    BUN 18  --    CR 0.81 0.91   GFRESTIMATED 70 62   GFRESTBLACK 85 75   JERMAIN 7.1*  --        Recent Labs  Lab 10/29/17  0700   WBC 4.0   HGB 10.5*   HCT 32.8*   MCV 90   *      Imaging:   No results " found for this or any previous visit (from the past 24 hour(s)).    Sonali Szymanski MD.

## 2017-10-30 ENCOUNTER — APPOINTMENT (OUTPATIENT)
Dept: GENERAL RADIOLOGY | Facility: CLINIC | Age: 67
DRG: 603 | End: 2017-10-30
Attending: INTERNAL MEDICINE
Payer: COMMERCIAL

## 2017-10-30 LAB
GLUCOSE BLDC GLUCOMTR-MCNC: 102 MG/DL (ref 70–99)
GLUCOSE BLDC GLUCOMTR-MCNC: 109 MG/DL (ref 70–99)
GLUCOSE BLDC GLUCOMTR-MCNC: 117 MG/DL (ref 70–99)
GLUCOSE BLDC GLUCOMTR-MCNC: 132 MG/DL (ref 70–99)
GLUCOSE BLDC GLUCOMTR-MCNC: 140 MG/DL (ref 70–99)
POTASSIUM SERPL-SCNC: 3.4 MMOL/L (ref 3.4–5.3)

## 2017-10-30 PROCEDURE — 27210209 ZZH KIT VALVED SINGLE LUMEN

## 2017-10-30 PROCEDURE — 40000986 XR CHEST PORT 1 VW

## 2017-10-30 PROCEDURE — 25000128 H RX IP 250 OP 636: Performed by: INTERNAL MEDICINE

## 2017-10-30 PROCEDURE — 00000146 ZZHCL STATISTIC GLUCOSE BY METER IP

## 2017-10-30 PROCEDURE — 12000000 ZZH R&B MED SURG/OB

## 2017-10-30 PROCEDURE — 99232 SBSQ HOSP IP/OBS MODERATE 35: CPT | Performed by: INTERNAL MEDICINE

## 2017-10-30 PROCEDURE — 84132 ASSAY OF SERUM POTASSIUM: CPT | Performed by: INTERNAL MEDICINE

## 2017-10-30 PROCEDURE — 25000132 ZZH RX MED GY IP 250 OP 250 PS 637: Performed by: INTERNAL MEDICINE

## 2017-10-30 RX ORDER — LIDOCAINE 40 MG/G
CREAM TOPICAL
Status: DISCONTINUED | OUTPATIENT
Start: 2017-10-30 | End: 2017-10-31 | Stop reason: HOSPADM

## 2017-10-30 RX ADMIN — ACETAMINOPHEN 650 MG: 325 TABLET, FILM COATED ORAL at 15:58

## 2017-10-30 RX ADMIN — FUROSEMIDE 40 MG: 40 TABLET ORAL at 08:47

## 2017-10-30 RX ADMIN — LEVOTHYROXINE SODIUM 150 MCG: 75 TABLET ORAL at 08:47

## 2017-10-30 RX ADMIN — Medication 1 MG: at 23:06

## 2017-10-30 RX ADMIN — Medication 1500 MG: at 14:08

## 2017-10-30 RX ADMIN — Medication 1500 MG: at 02:28

## 2017-10-30 ASSESSMENT — ACTIVITIES OF DAILY LIVING (ADL)
ADLS_ACUITY_SCORE: 9

## 2017-10-30 NOTE — PLAN OF CARE
Problem: Infection, Risk/Actual (Adult)  Goal: Identify Related Risk Factors and Signs and Symptoms  Related risk factors and signs and symptoms are identified upon initiation of Human Response Clinical Practice Guideline (CPG).   Outcome: Improving   Patient alert and oriented x 4. VSS, Denies pain. Up independently to the BR. PICC placed at midnight to right arm and placement verified by X-ray Vanco rescheduled by pharmacy.  Voiding well. Denies any SOB. +3 edema to bilateral LE  And UE.  bg 102 at 0200. Continue POC

## 2017-10-30 NOTE — PROGRESS NOTES
Pt tolerated routine PICC placement well.  Good blood flow and flushes without difficulty.  OK to use.    XR CHEST PORT 1 VIEW  10/30/2017 1:18 AM       HISTORY: RN placed PICC - verify tip placement.      COMPARISON: None.     FINDINGS: Upright portable chest. Right PICC has been placed and the  tip is in the distal SVC in good position. No pneumothorax. The heart  size is normal. The lungs are clear. Left shoulder arthroplasty.         IMPRESSION: Right PICC to distal SVC.

## 2017-10-30 NOTE — PROGRESS NOTES
[facility]   Procedure Note           Peripherally Inserted Central Line Catheter (PICC):       Lyndsay Biswas  MRN# [id number]   October 30, 2017, 12:50 AM Indication: Medication administration           Pause for the cause: Consent for catheter placement procedure signed  Time out completed  Patient ID's verified using two distinct indicators  All necessary equipment is present  Site marked if extremity to be used has been predetermined   Type of line to be used: PICC   Full barrier precautions used: Yes   Skin preparation: Chloraprep   Date of insertion: October 30, 2017, 12:50 AM   Device type: Single lumen, valved, 4.0   Catheter brand: Cartavi   Lot number: [id number]   Insertion location: Right basilic vein   Method of placement: Venipuncture  MST  Ultrasound   Number of attempts: With ultrasound: 1   Without ultrasound: 0   Difficulty threading: No   Midline IV device: Dressing dry and intact  Transparent semmipermeable dressing applied  Chlorhexidine patch  Catheter securement device   Arm circumference: Adults 15 cm   Midline extremity circumference: 38 cm   Internal length: 47 cm   Midline visible catheter length: 3 cm   Total catheter length: 50 cm   Tip termination: SVC   Method of verification: Chest x-ray   Midline patency post placement: Positive blood return  Flushes without difficulty   Line flush: Line flush documented on the eMAR   Placement verified by: Physician   Catheter placed by: Michael Haro   Discontinuation form initiated: Yes   Patient tolerance: Tolerated well      Summary:  This procedure was performed without difficulty and she tolerated the procedure well with no immediate complications.       Recorded by Michael Haro    Attestation:

## 2017-10-30 NOTE — PROGRESS NOTES
[facility]  Hospitalist Progress Note  Sonali Szymanski MD 10/29/17  Text Page  Pager: [phone] (7am-6pm)    Reason for Stay (Diagnosis): RUE Cellulitis         Assessment and Plan:      Summary of Stay: Lyndsay Biswas is a 67 year old female with past medical history of remote history of necrotizing fasciitis (believed MRSA, did not require surgery), chronic LE edema, DM2, morbid obesity and thyroid cancer who was admitted on 10/28/2017 with progressive erythema and pain of the right upper extremity despite PO antibiotics with progressive cellulitis.  Improving with IV Vancomycin.    Problem List/Assessment and Plan:     1. RUE cellulitis: significant progression of entire R arm swelling and erythema convincing for cellulitis despite taking Keflex as outpatient.  She was not septic but has history of prior necrotizing fasciitis (did not require surgery, believed to be MRSA).  She is significantly improving with IV Vancomycin.  Suspect that she has either MRSA or strep.  Will give one more day of IV Vancomycin given prior history of necrotizing fasciitis, as long as improving transition to PO antibiotics tomorrow.  - Continue IV Vancomycin     2. Hx of necrotizing fasciitis of the left leg: severe leg infection in 1996 and per patient luckily did not need fasciotomies but was close.  She was told this was MRSA infection, but she is not sure when asked if this was culture proven.  Has had one other episode of cellulitis in the left arm since then.     3. Prediabetes: last A1c 6.1, but is on metformin  mg hs.  Unclear if she has been taking Metformin, last refill was 2/2017 per pharmacy.  - Medium dose SSI  - Hold Metformin for now     4. Morbid obesity: BMI 52.5.     5. Chronic LE edema: PTA on lasix 40 mg daily, klor 30 meq daily, and prn Maxzide for leg swelling.  Has significant LE edema on exam, L > R.  - Resume Lasix      6. Hx papillary thyroid cancer: s/p thyroidectomy and LN  "dissection 2014.    - Resume PTA synthroid    DVT Prophylaxis: Pneumatic Compression Devices  Code Status: Full Code  Discharge Dispo: Home  Estimated Disch Date / # of Days until Disch: Tomorrow as long as cellulitis continues to improve        Interval History (Subjective):      Patient was seen with her bedside nurse this morning.  She is feeling ok.  PICC placed last night for IV antibiotics.  Still has significant swelling in the right arm and some pain but erythema has improved from admission. No CP, SOB, nausea, emesis.                  Physical Exam:      Last Vital Signs:  /76 (BP Location: Left arm)  Temp 97.7  F (36.5  C) (Oral)  Resp 18  Ht 1.549 m (5' 1\")  Wt 126.2 kg (278 lb 3.2 oz)  SpO2 96%  BMI 52.57 kg/m2    General: Alert, awake, no acute distress.  HEENT: Normocephalic and atraumatic, eyes anicteric and without scleral injection, EOMI, face symmetric, MMM.  Cardiac: RRR, normal S1, S2. No m/g/r, chronic lymphedema.  Pulmonary: Normal chest rise, normal work of breathing.  Lungs CTAB without crackles or wheezing.  Abdomen: soft, non-tender, non-distended.  Normoactive bowel sounds, no guarding or rebound tenderness.  Extremities: no deformities.  Warm, well perfused.  Skin: Erythema of right arm significantly diminished and has decreased from marked boarders, swollen right arm significantly compared to left arm.  Warm and Dry.  Neuro: No focal deficits.  Speech clear.  Coordination and strength grossly normal.  Psych: Alert and oriented x3. Appropriate affect.         Medications:      All current medications were reviewed with changes reflected in problem list.         Data:      All new lab and imaging data was reviewed.   Labs:    Recent Labs  Lab 10/30/17  0545 10/29/17  1635 10/29/17  0700 10/28/17  2050   NA  --   --  140  --    POTASSIUM 3.4 4.1 3.1*  --    CHLORIDE  --   --  106  --    CO2  --   --  27  --    ANIONGAP  --   --  7  --    GLC  --   --  109*  --    BUN  --   --  " 18  --    CR  --   --  0.81 0.91   GFRESTIMATED  --   --  70 62   GFRESTBLACK  --   --  85 75   JERMAIN  --   --  7.1*  --        Recent Labs  Lab 10/29/17  0700   WBC 4.0   HGB 10.5*   HCT 32.8*   MCV 90   *      Imaging:   Recent Results (from the past 24 hour(s))   XR Chest Port 1 View    Narrative    XR CHEST PORT 1 VIEW  10/30/2017 1:18 AM      HISTORY: RN placed PICC - verify tip placement.     COMPARISON: None.    FINDINGS: Upright portable chest. Right PICC has been placed and the  tip is in the distal SVC in good position. No pneumothorax. The heart  size is normal. The lungs are clear. Left shoulder arthroplasty.      Impression    IMPRESSION: Right PICC to distal SVC.    MD Sonali ROTH MD.

## 2017-10-30 NOTE — PLAN OF CARE
Problem: Infection, Risk/Actual (Adult)  Goal: Identify Related Risk Factors and Signs and Symptoms  Related risk factors and signs and symptoms are identified upon initiation of Human Response Clinical Practice Guideline (CPG).   Outcome: Improving  VSS, low grade temps overnight, afebrile today.  On IV vancomycin Q 12hrs for right upper arm cellulitis, improving.  PICC placed overnight, remains in place at this time.  Plans to switch to PO abx and discharge.

## 2017-10-30 NOTE — PLAN OF CARE
Problem: Patient Care Overview  Goal: Plan of Care/Patient Progress Review  Outcome: No Change  Iv leaking, anesthesia called and unable to restart. Dr. Lang notified and PICC ordered. Charge nurse notified. Discussed with pt and she is agreeable, states she had a central line when she had left leg infection.

## 2017-10-30 NOTE — PROGRESS NOTES
Patient requires contact precautions due to previous possible MRSA necrotizing fasciitis per H&P 10/28/17, please contact Infection Prevention with any questions or concerns at [phone].

## 2017-10-31 VITALS
SYSTOLIC BLOOD PRESSURE: 156 MMHG | DIASTOLIC BLOOD PRESSURE: 79 MMHG | RESPIRATION RATE: 16 BRPM | OXYGEN SATURATION: 93 % | HEIGHT: 61 IN | BODY MASS INDEX: 52.52 KG/M2 | WEIGHT: 278.2 LBS | TEMPERATURE: 97.1 F

## 2017-10-31 LAB
CREAT SERPL-MCNC: 0.73 MG/DL (ref 0.52–1.04)
ERYTHROCYTE [DISTWIDTH] IN BLOOD BY AUTOMATED COUNT: 13.5 % (ref 10–15)
GFR SERPL CREATININE-BSD FRML MDRD: 79 ML/MIN/1.7M2
GLUCOSE BLDC GLUCOMTR-MCNC: 148 MG/DL (ref 70–99)
HCT VFR BLD AUTO: 34.3 % (ref 35–47)
HGB BLD-MCNC: 11.1 G/DL (ref 11.7–15.7)
MCH RBC QN AUTO: 28.9 PG (ref 26.5–33)
MCHC RBC AUTO-ENTMCNC: 32.4 G/DL (ref 31.5–36.5)
MCV RBC AUTO: 89 FL (ref 78–100)
PLATELET # BLD AUTO: 164 10E9/L (ref 150–450)
RBC # BLD AUTO: 3.84 10E12/L (ref 3.8–5.2)
WBC # BLD AUTO: 4 10E9/L (ref 4–11)

## 2017-10-31 PROCEDURE — 25000132 ZZH RX MED GY IP 250 OP 250 PS 637: Performed by: INTERNAL MEDICINE

## 2017-10-31 PROCEDURE — 00000146 ZZHCL STATISTIC GLUCOSE BY METER IP

## 2017-10-31 PROCEDURE — 99239 HOSP IP/OBS DSCHRG MGMT >30: CPT | Performed by: INTERNAL MEDICINE

## 2017-10-31 PROCEDURE — 82565 ASSAY OF CREATININE: CPT | Performed by: INTERNAL MEDICINE

## 2017-10-31 PROCEDURE — 25000128 H RX IP 250 OP 636: Performed by: INTERNAL MEDICINE

## 2017-10-31 PROCEDURE — 85027 COMPLETE CBC AUTOMATED: CPT | Performed by: INTERNAL MEDICINE

## 2017-10-31 RX ORDER — SULFAMETHOXAZOLE/TRIMETHOPRIM 800-160 MG
1 TABLET ORAL 2 TIMES DAILY
Qty: 12 TABLET | Refills: 0 | Status: SHIPPED | OUTPATIENT
Start: 2017-10-31 | End: 2017-11-06

## 2017-10-31 RX ORDER — AMOXICILLIN 500 MG/1
500 CAPSULE ORAL 3 TIMES DAILY
Qty: 18 CAPSULE | Refills: 0 | Status: SHIPPED | OUTPATIENT
Start: 2017-10-31 | End: 2017-11-06

## 2017-10-31 RX ORDER — GINSENG 100 MG
CAPSULE ORAL
Status: DISCONTINUED
Start: 2017-10-31 | End: 2017-10-31 | Stop reason: HOSPADM

## 2017-10-31 RX ADMIN — Medication 2.5 MG: at 02:41

## 2017-10-31 RX ADMIN — LEVOTHYROXINE SODIUM 150 MCG: 75 TABLET ORAL at 06:57

## 2017-10-31 RX ADMIN — FUROSEMIDE 40 MG: 40 TABLET ORAL at 08:48

## 2017-10-31 RX ADMIN — Medication 1500 MG: at 02:00

## 2017-10-31 ASSESSMENT — ACTIVITIES OF DAILY LIVING (ADL)
ADLS_ACUITY_SCORE: 9

## 2017-10-31 NOTE — DISCHARGE INSTRUCTIONS
1. Take the Amoxicillin and Bactrim until all the pills are gone to treat the cellulitis of your arm.  While taking the antibiotics you should take a probiotic or eat yogurt with live cultures.  2. If you have return of redness, swelling or pain in the right arm you need to be seen again.

## 2017-10-31 NOTE — PLAN OF CARE
Problem: Infection, Risk/Actual (Adult)  Goal: Identify Related Risk Factors and Signs and Symptoms  Related risk factors and signs and symptoms are identified upon initiation of Human Response Clinical Practice Guideline (CPG).   Outcome: Improving  VSS, a febrile, on IV vanco.  Right upper ext Cellulitis improving. Plan for discharge today. Went over dc paperwork with pt. Questions asked and answered. Verbalized understanding. Pt dc'd at 1200 per wc with all belongings,paperwork and meds.

## 2017-10-31 NOTE — PLAN OF CARE
Problem: Patient Care Overview  Goal: Plan of Care/Patient Progress Review  A/Ox4.  VSS.  Denies pain.  C/o restlessness & having a difficult time falling asleep.  MD updated & ordered Ambien. LS clear.  Denies SOB.  Edema to shabbir LE.  Right arm elevated.  PICC line saline locked.  Continues on IV Vanco.  .  Voiding without difficulty.  Plan for possible DC today.

## 2017-10-31 NOTE — DISCHARGE SUMMARY
"[facility]  Discharge Summary  Name: Lyndsay Biswas    MRN: [id number]  YOB: 1950    Age: 67 year old  Date of Discharge:  10/31/2017  Date of Admission: 10/28/2017  Primary Care Provider: Yehuda Marks  Discharge Physician:  Sonali Szymanski MD  Discharging Service:  Hospitalist      Discharge Diagnoses:  1. RUE Cellulitis  2. Pre diabetes  3. Chronic LE Edema  4. History of Papillary Thyroid Cancer      Hospital Course:  Lyndsay Biswas is a 67 year old female with past medical history of remote history of necrotizing fasciitis (believed MRSA, did not require surgery), chronic LE edema, DM2, morbid obesity and thyroid cancer who was admitted on 10/28/2017 with progressive erythema and pain of the right upper extremity despite PO antibiotics with progressive cellulitis.  She was started on IV Vancomycin and her cellulitis improved.  She will be discharged on PO Amoxicillin and Bactrim to cover for both MRSA and strep (for 6 more days).  She was otherwise continued on her PTA medications.       Discharge Disposition:  Discharged to home     Allergies:  No Known Allergies     Condition on Discharge:  Discharge condition: Good   Discharge vitals: Blood pressure 156/79, temperature 97.1  F (36.2  C), temperature source Oral, resp. rate 16, height 1.549 m (5' 1\"), weight 126.2 kg (278 lb 3.2 oz), SpO2 93 %.   Code status on discharge: Full Code     History of Illness:  See detailed admission note for full details.    Physical Exam:  Blood pressure 156/79, temperature 97.1  F (36.2  C), temperature source Oral, resp. rate 16, height 1.549 m (5' 1\"), weight 126.2 kg (278 lb 3.2 oz), SpO2 93 %.  Wt Readings from Last 1 Encounters:   10/28/17 126.2 kg (278 lb 3.2 oz)     General: Alert, awake, no acute distress.  HEENT: Normocephalic and atraumatic, eyes anicteric and without scleral injection, EOMI, face symmetric, MMM.  Cardiac: RRR, normal S1, S2. No m/g/r, chronic " lymphedema.  Pulmonary: Normal chest rise, normal work of breathing.  Lungs CTAB without crackles or wheezing.  Abdomen: soft, non-tender, non-distended.  Normoactive bowel sounds, no guarding or rebound tenderness.  Extremities: no deformities.  Warm, well perfused.  Skin: Erythema of right arm has now resolved, right arm now only slightly swollen when compared to the left, no warmth or tenderness.  Warm and Dry.  Neuro: No focal deficits.  Speech clear.  Coordination and strength grossly normal.  Psych: Alert and oriented x3. Appropriate affect.    Procedures other than Imaging:  IV Antibiotics, PICC Line     Imaging:  Results for orders placed or performed during the hospital encounter of 10/28/17   XR Chest Port 1 View    Narrative    XR CHEST PORT 1 VIEW  10/30/2017 1:18 AM      HISTORY: RN placed PICC - verify tip placement.     COMPARISON: None.    FINDINGS: Upright portable chest. Right PICC has been placed and the  tip is in the distal SVC in good position. No pneumothorax. The heart  size is normal. The lungs are clear. Left shoulder arthroplasty.      Impression    IMPRESSION: Right PICC to distal SVC.    CARLITO BARNHART MD        Consultations:  Consultations This Hospital Stay   PHARMACY TO DOSE VANCO  VASCULAR ACCESS ADULT IP CONSULT     Recent Lab Results:    Recent Labs  Lab 10/31/17  0540 10/29/17  0700   WBC 4.0 4.0   HGB 11.1* 10.5*   HCT 34.3* 32.8*   MCV 89 90    141*       Recent Labs  Lab 10/31/17  0540 10/30/17  0545 10/29/17  1635 10/29/17  0700 10/28/17  2050   NA  --   --   --  140  --    POTASSIUM  --  3.4 4.1 3.1*  --    CHLORIDE  --   --   --  106  --    CO2  --   --   --  27  --    ANIONGAP  --   --   --  7  --    GLC  --   --   --  109*  --    BUN  --   --   --  18  --    CR 0.73  --   --  0.81 0.91   GFRESTIMATED 79  --   --  70 62   GFRESTBLACK >90  --   --  85 75   JERMAIN  --   --   --  7.1*  --           Pending Results:    Unresulted Labs Ordered in the Past 30 Days of this  Admission     No orders found from 8/29/2017 to 10/29/2017.           Discharge Instructions and Follow-Up:   Discharge Orders     Reason for your hospital stay   You were hospitalized for cellulitis (skin infection) of the right arm.     Follow-up and recommended labs and tests    Follow up with your primary care doctor if the cellulitis is not resolving.     Activity   Your activity upon discharge: activity as tolerated     Full Code     Diet   Follow this diet upon discharge: Orders Placed This Encounter     Moderate Consistent CHO Diet       Discharge Medications   Current Discharge Medication List      START taking these medications    Details   sulfamethoxazole-trimethoprim (BACTRIM DS/SEPTRA DS) 800-160 MG per tablet Take 1 tablet by mouth 2 times daily for 6 days  Qty: 12 tablet, Refills: 0    Associated Diagnoses: Right arm cellulitis      amoxicillin (AMOXIL) 500 MG capsule Take 1 capsule (500 mg) by mouth 3 times daily for 6 days  Qty: 18 capsule, Refills: 0    Associated Diagnoses: Right arm cellulitis         CONTINUE these medications which have NOT CHANGED    Details   Naproxen Sodium (ALEVE PO) Take 220 mg by mouth daily as needed for moderate pain      multivitamin, therapeutic with minerals (MULTI-VITAMIN) TABS tablet Take 1 tablet by mouth daily      calcium carbonate (OS-JERMAIN 500 MG [place]. [place]) 1250 MG tablet Take 2 tablets by mouth daily      furosemide (LASIX) 40 MG tablet Take 40 mg by mouth daily      potassium chloride (KLOR-CON) 10 MEQ tablet Take 30 mEq by mouth daily      levothyroxine (SYNTHROID/LEVOTHROID) 150 MCG tablet Take 150 mcg by mouth every morning      triamterene-hydrochlorothiazide (MAXZIDE-25) 37.5-25 MG per tablet Take 1 tablet by mouth nightly as needed for edema      metFORMIN modified (GLUMETZA) 500 MG 24 hr tablet Take 2,000 mg by mouth At Bedtime            Time Spent on this Encounter   MARCELO, Sonali Szymanski, personally saw the patient today and spent greater than 30  minutes discharging this patient.    Sonali Szymanski MD

## 2017-10-31 NOTE — PLAN OF CARE
A&O x 4. Up independent. Denies pain. PICC line RUE- Vanco q12hrs. BG monitoring  Plan DC tomorrow

## 2021-05-25 ENCOUNTER — RECORDS - HEALTHEAST (OUTPATIENT)
Dept: ADMINISTRATIVE | Facility: CLINIC | Age: 71
End: 2021-05-25

## 2025-02-24 ENCOUNTER — TRANSFERRED RECORDS (OUTPATIENT)
Dept: HEALTH INFORMATION MANAGEMENT | Facility: CLINIC | Age: 75
End: 2025-02-24
Payer: COMMERCIAL

## 2025-02-24 LAB
CHOLESTEROL (EXTERNAL): 161 MG/DL
HBA1C MFR BLD: 6.3 %
HDLC SERPL-MCNC: 70 MG/DL
LDL CHOLESTEROL CALCULATED (EXTERNAL): 77 MG/DL
NON HDL CHOLESTEROL (EXTERNAL): 91 MG/DL
POTASSIUM (EXTERNAL): 3 MMOL/L (ref 3.5–5.3)
TRIGLYCERIDES (EXTERNAL): 55 MG/DL

## 2025-02-26 ENCOUNTER — TRANSCRIBE ORDERS (OUTPATIENT)
Dept: OTHER | Age: 75
End: 2025-02-26

## 2025-02-26 DIAGNOSIS — R20.0 NUMBNESS OF LEFT HAND: Primary | ICD-10-CM

## 2025-08-25 ENCOUNTER — LAB REQUISITION (OUTPATIENT)
Dept: LAB | Facility: CLINIC | Age: 75
End: 2025-08-25
Payer: COMMERCIAL

## 2025-08-25 DIAGNOSIS — Z11.1 ENCOUNTER FOR SCREENING FOR RESPIRATORY TUBERCULOSIS: ICD-10-CM

## 2025-08-27 LAB
GAMMA INTERFERON BACKGROUND BLD IA-ACNC: 0.03 IU/ML
M TB IFN-G BLD-IMP: NEGATIVE
M TB IFN-G CD4+ BCKGRND COR BLD-ACNC: 9.97 IU/ML
MITOGEN IGNF BCKGRD COR BLD-ACNC: -0.01 IU/ML
MITOGEN IGNF BCKGRD COR BLD-ACNC: 0 IU/ML
QUANTIFERON MITOGEN: 10 IU/ML
QUANTIFERON NIL TUBE: 0.03 IU/ML
QUANTIFERON TB1 TUBE: 0.02 IU/ML
QUANTIFERON TB2 TUBE: 0.03

## 2025-08-29 ENCOUNTER — LAB REQUISITION (OUTPATIENT)
Dept: LAB | Facility: CLINIC | Age: 75
End: 2025-08-29
Payer: COMMERCIAL

## 2025-08-29 DIAGNOSIS — R60.9 EDEMA, UNSPECIFIED: ICD-10-CM

## 2025-09-02 LAB
ANION GAP SERPL CALCULATED.3IONS-SCNC: 14 MMOL/L (ref 7–15)
BUN SERPL-MCNC: 26 MG/DL (ref 8–23)
CALCIUM SERPL-MCNC: 9.1 MG/DL (ref 8.8–10.4)
CHLORIDE SERPL-SCNC: 101 MMOL/L (ref 98–107)
CREAT SERPL-MCNC: 1.02 MG/DL (ref 0.51–0.95)
EGFRCR SERPLBLD CKD-EPI 2021: 57 ML/MIN/1.73M2
GLUCOSE SERPL-MCNC: 97 MG/DL (ref 70–99)
HCO3 SERPL-SCNC: 23 MMOL/L (ref 22–29)
POTASSIUM SERPL-SCNC: 3.7 MMOL/L (ref 3.4–5.3)
SODIUM SERPL-SCNC: 138 MMOL/L (ref 135–145)